# Patient Record
Sex: MALE | Race: WHITE | NOT HISPANIC OR LATINO
[De-identification: names, ages, dates, MRNs, and addresses within clinical notes are randomized per-mention and may not be internally consistent; named-entity substitution may affect disease eponyms.]

---

## 2017-02-03 ENCOUNTER — APPOINTMENT (OUTPATIENT)
Dept: UROLOGY | Facility: CLINIC | Age: 77
End: 2017-02-03

## 2017-02-03 VITALS
DIASTOLIC BLOOD PRESSURE: 80 MMHG | SYSTOLIC BLOOD PRESSURE: 161 MMHG | TEMPERATURE: 97.5 F | RESPIRATION RATE: 17 BRPM | HEART RATE: 66 BPM | BODY MASS INDEX: 27.47 KG/M2 | HEIGHT: 67 IN | WEIGHT: 175 LBS

## 2017-02-28 ENCOUNTER — OUTPATIENT (OUTPATIENT)
Dept: OUTPATIENT SERVICES | Facility: HOSPITAL | Age: 77
LOS: 1 days | End: 2017-02-28
Payer: MEDICARE

## 2017-02-28 VITALS
HEIGHT: 64 IN | RESPIRATION RATE: 16 BRPM | SYSTOLIC BLOOD PRESSURE: 160 MMHG | HEART RATE: 84 BPM | TEMPERATURE: 100 F | DIASTOLIC BLOOD PRESSURE: 80 MMHG | WEIGHT: 177.91 LBS

## 2017-02-28 DIAGNOSIS — N40.1 BENIGN PROSTATIC HYPERPLASIA WITH LOWER URINARY TRACT SYMPTOMS: ICD-10-CM

## 2017-02-28 DIAGNOSIS — I25.10 ATHEROSCLEROTIC HEART DISEASE OF NATIVE CORONARY ARTERY WITHOUT ANGINA PECTORIS: ICD-10-CM

## 2017-02-28 LAB
APPEARANCE UR: CLEAR — SIGNIFICANT CHANGE UP
BILIRUB UR-MCNC: NEGATIVE — SIGNIFICANT CHANGE UP
BLD GP AB SCN SERPL QL: NEGATIVE — SIGNIFICANT CHANGE UP
BLOOD UR QL VISUAL: NEGATIVE — SIGNIFICANT CHANGE UP
BUN SERPL-MCNC: 36 MG/DL — HIGH (ref 7–23)
CALCIUM SERPL-MCNC: 9.6 MG/DL — SIGNIFICANT CHANGE UP (ref 8.4–10.5)
CHLORIDE SERPL-SCNC: 105 MMOL/L — SIGNIFICANT CHANGE UP (ref 98–107)
CO2 SERPL-SCNC: 23 MMOL/L — SIGNIFICANT CHANGE UP (ref 22–31)
COLOR SPEC: YELLOW — SIGNIFICANT CHANGE UP
CREAT SERPL-MCNC: 1.57 MG/DL — HIGH (ref 0.5–1.3)
GLUCOSE SERPL-MCNC: 94 MG/DL — SIGNIFICANT CHANGE UP (ref 70–99)
GLUCOSE UR-MCNC: NEGATIVE — SIGNIFICANT CHANGE UP
HCT VFR BLD CALC: 42.1 % — SIGNIFICANT CHANGE UP (ref 39–50)
HGB BLD-MCNC: 14 G/DL — SIGNIFICANT CHANGE UP (ref 13–17)
KETONES UR-MCNC: NEGATIVE — SIGNIFICANT CHANGE UP
LEUKOCYTE ESTERASE UR-ACNC: NEGATIVE — SIGNIFICANT CHANGE UP
MCHC RBC-ENTMCNC: 31.3 PG — SIGNIFICANT CHANGE UP (ref 27–34)
MCHC RBC-ENTMCNC: 33.3 % — SIGNIFICANT CHANGE UP (ref 32–36)
MCV RBC AUTO: 94.2 FL — SIGNIFICANT CHANGE UP (ref 80–100)
MUCOUS THREADS # UR AUTO: SIGNIFICANT CHANGE UP
NITRITE UR-MCNC: NEGATIVE — SIGNIFICANT CHANGE UP
PH UR: 6 — SIGNIFICANT CHANGE UP (ref 4.6–8)
PLATELET # BLD AUTO: 158 K/UL — SIGNIFICANT CHANGE UP (ref 150–400)
PMV BLD: 11.8 FL — SIGNIFICANT CHANGE UP (ref 7–13)
POTASSIUM SERPL-MCNC: 4.3 MMOL/L — SIGNIFICANT CHANGE UP (ref 3.5–5.3)
POTASSIUM SERPL-SCNC: 4.3 MMOL/L — SIGNIFICANT CHANGE UP (ref 3.5–5.3)
PROT UR-MCNC: 100 — SIGNIFICANT CHANGE UP
RBC # BLD: 4.47 M/UL — SIGNIFICANT CHANGE UP (ref 4.2–5.8)
RBC # FLD: 13.9 % — SIGNIFICANT CHANGE UP (ref 10.3–14.5)
RBC CASTS # UR COMP ASSIST: SIGNIFICANT CHANGE UP (ref 0–?)
RH IG SCN BLD-IMP: POSITIVE — SIGNIFICANT CHANGE UP
SODIUM SERPL-SCNC: 144 MMOL/L — SIGNIFICANT CHANGE UP (ref 135–145)
SP GR SPEC: 1.02 — SIGNIFICANT CHANGE UP (ref 1–1.03)
SQUAMOUS # UR AUTO: SIGNIFICANT CHANGE UP
UROBILINOGEN FLD QL: NORMAL E.U. — SIGNIFICANT CHANGE UP (ref 0.1–0.2)
WBC # BLD: 8.85 K/UL — SIGNIFICANT CHANGE UP (ref 3.8–10.5)
WBC # FLD AUTO: 8.85 K/UL — SIGNIFICANT CHANGE UP (ref 3.8–10.5)
WBC UR QL: SIGNIFICANT CHANGE UP (ref 0–?)

## 2017-02-28 PROCEDURE — 93010 ELECTROCARDIOGRAM REPORT: CPT

## 2017-02-28 RX ORDER — SODIUM CHLORIDE 9 MG/ML
3 INJECTION INTRAMUSCULAR; INTRAVENOUS; SUBCUTANEOUS EVERY 8 HOURS
Qty: 0 | Refills: 0 | Status: DISCONTINUED | OUTPATIENT
Start: 2017-03-08 | End: 2017-03-09

## 2017-02-28 RX ORDER — SODIUM CHLORIDE 9 MG/ML
1000 INJECTION, SOLUTION INTRAVENOUS
Qty: 0 | Refills: 0 | Status: DISCONTINUED | OUTPATIENT
Start: 2017-03-08 | End: 2017-03-09

## 2017-02-28 NOTE — H&P PST ADULT - NEGATIVE ENMT SYMPTOMS
no nose bleeds/no ear pain/no hearing difficulty/no post-nasal discharge/no nasal obstruction/no tinnitus/no abnormal taste sensation/no dry mouth/no sinus symptoms/no dysphagia/no vertigo/no throat pain

## 2017-02-28 NOTE — H&P PST ADULT - PROBLEM SELECTOR PLAN 2
Pt with stented coronary artery, pt instructed to continue Aspirin prior to surgery. Pt verbalized understanding.

## 2017-02-28 NOTE — H&P PST ADULT - GASTROINTESTINAL DETAILS
no masses palpable/no distention/nontender/soft nontender/bowel sounds normal/no masses palpable/no distention/soft

## 2017-02-28 NOTE — H&P PST ADULT - NEGATIVE BREAST SYMPTOMS
no nipple discharge R/no breast tenderness R/no breast tenderness L/no breast lump L/no breast lump R/no nipple discharge L

## 2017-02-28 NOTE — H&P PST ADULT - VISION (WITH CORRECTIVE LENSES IF THE PATIENT USUALLY WEARS THEM):
Partially impaired: cannot see medication labels or newsprint, but can see obstacles in path, and the surrounding layout; can count fingers at arm's length/Pt uses reading glasses

## 2017-02-28 NOTE — H&P PST ADULT - PMH
Alcoholism    BPH (Benign Prostatic Hypertrophy)    CAD (Coronary Artery Disease)    Enlarged prostate with lower urinary tract symptoms (LUTS)    Heart Attack  coronary stent insrtion(SHERRY)- RCA on 09/17/2002  Heart Murmur    HTN (Hypertension)    Hyperlipidemia    Inguinal Hernia  left 2/2011  Obesity (BMI 30.0-34.9)

## 2017-02-28 NOTE — H&P PST ADULT - HISTORY OF PRESENT ILLNESS
75 y/o  male with PMH: HTN, CAD - s/p stented coronary artery, HLD, BPH, GERD, right kidney cancer - S/P nephrectomy in 2010 presents to PST for pre op evaluation with long term hx of urinary frequency, worsening for the last 3 years. Now scheduled for bipolar TURP on 03/08/17 75 y/o  male with PMH: HTN, CAD - s/p stented coronary artery, HLD, BPH, GERD, right kidney cancer - S/P nephrectomy in 2010 presents to PST for pre op evaluation with long term hx of urinary frequency that has been worsening for the last 3 years. Now scheduled for bipolar TURP on 03/08/17

## 2017-02-28 NOTE — H&P PST ADULT - PROBLEM SELECTOR PLAN 1
Scheduled for bipolar transurethral resection of prostate on 03/08/2017. Pre op instructions, famotidine given and explained. Pt verbalized understanding.  Pending medical clearance

## 2017-02-28 NOTE — H&P PST ADULT - NEGATIVE OPHTHALMOLOGIC SYMPTOMS
no photophobia/no diplopia/no loss of vision L/no pain R/no lacrimation L/no discharge R/no lacrimation R/no discharge L/no blurred vision R/no loss of vision R/no pain L/no irritation R/no blurred vision L/no irritation L

## 2017-02-28 NOTE — H&P PST ADULT - PSH
H/O hernia repair  Right inguinal-2002  Renal cancer  h/o right nephrectomy- 05/2011  Renal cancer  2/2010 no further treatment  Postsurgical percutaneous transluminal coronary angioplasty status  RCA  Other postprocedural status

## 2017-02-28 NOTE — H&P PST ADULT - NSANTHOSAYNRD_GEN_A_CORE
No. KANIKA screening performed.  STOP BANG Legend: 0-2 = LOW Risk; 3-4 = INTERMEDIATE Risk; 5-8 = HIGH Risk

## 2017-03-01 LAB — SPECIMEN SOURCE: SIGNIFICANT CHANGE UP

## 2017-03-02 LAB — BACTERIA UR CULT: SIGNIFICANT CHANGE UP

## 2017-03-05 ENCOUNTER — FORM ENCOUNTER (OUTPATIENT)
Age: 77
End: 2017-03-05

## 2017-03-06 ENCOUNTER — APPOINTMENT (OUTPATIENT)
Dept: MRI IMAGING | Facility: IMAGING CENTER | Age: 77
End: 2017-03-06

## 2017-03-06 ENCOUNTER — APPOINTMENT (OUTPATIENT)
Dept: RADIOLOGY | Facility: IMAGING CENTER | Age: 77
End: 2017-03-06

## 2017-03-06 ENCOUNTER — OUTPATIENT (OUTPATIENT)
Dept: OUTPATIENT SERVICES | Facility: HOSPITAL | Age: 77
LOS: 1 days | End: 2017-03-06
Payer: MEDICARE

## 2017-03-06 DIAGNOSIS — C64.9 MALIGNANT NEOPLASM OF UNSPECIFIED KIDNEY, EXCEPT RENAL PELVIS: ICD-10-CM

## 2017-03-06 PROCEDURE — A9585: CPT

## 2017-03-06 PROCEDURE — 74183 MRI ABD W/O CNTR FLWD CNTR: CPT

## 2017-03-06 PROCEDURE — 71046 X-RAY EXAM CHEST 2 VIEWS: CPT

## 2017-03-06 PROCEDURE — 82565 ASSAY OF CREATININE: CPT

## 2017-03-07 ENCOUNTER — RESULT REVIEW (OUTPATIENT)
Age: 77
End: 2017-03-07

## 2017-03-08 ENCOUNTER — INPATIENT (INPATIENT)
Facility: HOSPITAL | Age: 77
LOS: 0 days | Discharge: HOME | End: 2017-03-09
Attending: UROLOGY | Admitting: UROLOGY
Payer: MEDICARE

## 2017-03-08 ENCOUNTER — APPOINTMENT (OUTPATIENT)
Dept: UROLOGY | Facility: HOSPITAL | Age: 77
End: 2017-03-08

## 2017-03-08 VITALS
OXYGEN SATURATION: 96 % | HEIGHT: 64 IN | TEMPERATURE: 98 F | WEIGHT: 177.91 LBS | DIASTOLIC BLOOD PRESSURE: 64 MMHG | SYSTOLIC BLOOD PRESSURE: 153 MMHG | RESPIRATION RATE: 16 BRPM | HEART RATE: 66 BPM

## 2017-03-08 DIAGNOSIS — N40.1 BENIGN PROSTATIC HYPERPLASIA WITH LOWER URINARY TRACT SYMPTOMS: ICD-10-CM

## 2017-03-08 LAB
BUN SERPL-MCNC: 24 MG/DL — HIGH (ref 7–23)
CALCIUM SERPL-MCNC: 8.2 MG/DL — LOW (ref 8.4–10.5)
CHLORIDE SERPL-SCNC: 109 MMOL/L — HIGH (ref 98–107)
CO2 SERPL-SCNC: 21 MMOL/L — LOW (ref 22–31)
CREAT SERPL-MCNC: 1.37 MG/DL — HIGH (ref 0.5–1.3)
GLUCOSE SERPL-MCNC: 117 MG/DL — HIGH (ref 70–99)
HCT VFR BLD CALC: 40.7 % — SIGNIFICANT CHANGE UP (ref 39–50)
HGB BLD-MCNC: 13.3 G/DL — SIGNIFICANT CHANGE UP (ref 13–17)
MCHC RBC-ENTMCNC: 30.6 PG — SIGNIFICANT CHANGE UP (ref 27–34)
MCHC RBC-ENTMCNC: 32.7 % — SIGNIFICANT CHANGE UP (ref 32–36)
MCV RBC AUTO: 93.8 FL — SIGNIFICANT CHANGE UP (ref 80–100)
PLATELET # BLD AUTO: 186 K/UL — SIGNIFICANT CHANGE UP (ref 150–400)
PMV BLD: 11 FL — SIGNIFICANT CHANGE UP (ref 7–13)
POTASSIUM SERPL-MCNC: 4.2 MMOL/L — SIGNIFICANT CHANGE UP (ref 3.5–5.3)
POTASSIUM SERPL-SCNC: 4.2 MMOL/L — SIGNIFICANT CHANGE UP (ref 3.5–5.3)
RBC # BLD: 4.34 M/UL — SIGNIFICANT CHANGE UP (ref 4.2–5.8)
RBC # FLD: 13.6 % — SIGNIFICANT CHANGE UP (ref 10.3–14.5)
SODIUM SERPL-SCNC: 144 MMOL/L — SIGNIFICANT CHANGE UP (ref 135–145)
WBC # BLD: 6.9 K/UL — SIGNIFICANT CHANGE UP (ref 3.8–10.5)
WBC # FLD AUTO: 6.9 K/UL — SIGNIFICANT CHANGE UP (ref 3.8–10.5)

## 2017-03-08 PROCEDURE — 88305 TISSUE EXAM BY PATHOLOGIST: CPT | Mod: 26

## 2017-03-08 RX ORDER — OXYCODONE HYDROCHLORIDE 5 MG/1
5 TABLET ORAL EVERY 4 HOURS
Qty: 0 | Refills: 0 | Status: DISCONTINUED | OUTPATIENT
Start: 2017-03-08 | End: 2017-03-08

## 2017-03-08 RX ORDER — ATORVASTATIN CALCIUM 80 MG/1
20 TABLET, FILM COATED ORAL AT BEDTIME
Qty: 0 | Refills: 0 | Status: DISCONTINUED | OUTPATIENT
Start: 2017-03-08 | End: 2017-03-09

## 2017-03-08 RX ORDER — ONDANSETRON 8 MG/1
4 TABLET, FILM COATED ORAL ONCE
Qty: 0 | Refills: 0 | Status: DISCONTINUED | OUTPATIENT
Start: 2017-03-08 | End: 2017-03-08

## 2017-03-08 RX ORDER — FENTANYL CITRATE 50 UG/ML
25 INJECTION INTRAVENOUS
Qty: 0 | Refills: 0 | Status: DISCONTINUED | OUTPATIENT
Start: 2017-03-08 | End: 2017-03-08

## 2017-03-08 RX ORDER — HYDROMORPHONE HYDROCHLORIDE 2 MG/ML
4 INJECTION INTRAMUSCULAR; INTRAVENOUS; SUBCUTANEOUS EVERY 6 HOURS
Qty: 0 | Refills: 0 | Status: DISCONTINUED | OUTPATIENT
Start: 2017-03-08 | End: 2017-03-09

## 2017-03-08 RX ORDER — ACETAMINOPHEN 500 MG
650 TABLET ORAL EVERY 6 HOURS
Qty: 0 | Refills: 0 | Status: DISCONTINUED | OUTPATIENT
Start: 2017-03-08 | End: 2017-03-08

## 2017-03-08 RX ORDER — HYDROMORPHONE HYDROCHLORIDE 2 MG/ML
2 INJECTION INTRAMUSCULAR; INTRAVENOUS; SUBCUTANEOUS EVERY 4 HOURS
Qty: 0 | Refills: 0 | Status: DISCONTINUED | OUTPATIENT
Start: 2017-03-08 | End: 2017-03-09

## 2017-03-08 RX ORDER — HYDRALAZINE HCL 50 MG
50 TABLET ORAL THREE TIMES A DAY
Qty: 0 | Refills: 0 | Status: DISCONTINUED | OUTPATIENT
Start: 2017-03-08 | End: 2017-03-09

## 2017-03-08 RX ORDER — OXYCODONE HYDROCHLORIDE 5 MG/1
10 TABLET ORAL EVERY 6 HOURS
Qty: 0 | Refills: 0 | Status: DISCONTINUED | OUTPATIENT
Start: 2017-03-08 | End: 2017-03-08

## 2017-03-08 RX ORDER — HYDROMORPHONE HYDROCHLORIDE 2 MG/ML
1 INJECTION INTRAMUSCULAR; INTRAVENOUS; SUBCUTANEOUS
Qty: 0 | Refills: 0 | Status: DISCONTINUED | OUTPATIENT
Start: 2017-03-08 | End: 2017-03-08

## 2017-03-08 RX ORDER — ATORVASTATIN CALCIUM 80 MG/1
1 TABLET, FILM COATED ORAL
Qty: 0 | Refills: 0 | COMMUNITY

## 2017-03-08 RX ORDER — HYDRALAZINE HCL 50 MG
1 TABLET ORAL
Qty: 0 | Refills: 0 | COMMUNITY

## 2017-03-08 RX ORDER — HEPARIN SODIUM 5000 [USP'U]/ML
5000 INJECTION INTRAVENOUS; SUBCUTANEOUS EVERY 8 HOURS
Qty: 0 | Refills: 0 | Status: DISCONTINUED | OUTPATIENT
Start: 2017-03-08 | End: 2017-03-08

## 2017-03-08 RX ORDER — TAMSULOSIN HYDROCHLORIDE 0.4 MG/1
0.4 CAPSULE ORAL AT BEDTIME
Qty: 0 | Refills: 0 | Status: DISCONTINUED | OUTPATIENT
Start: 2017-03-08 | End: 2017-03-09

## 2017-03-08 RX ORDER — NEBIVOLOL HYDROCHLORIDE 5 MG/1
1 TABLET ORAL
Qty: 0 | Refills: 0 | COMMUNITY

## 2017-03-08 RX ORDER — HYDROMORPHONE HYDROCHLORIDE 2 MG/ML
2 INJECTION INTRAMUSCULAR; INTRAVENOUS; SUBCUTANEOUS EVERY 6 HOURS
Qty: 0 | Refills: 0 | Status: DISCONTINUED | OUTPATIENT
Start: 2017-03-08 | End: 2017-03-09

## 2017-03-08 RX ORDER — TAMSULOSIN HYDROCHLORIDE 0.4 MG/1
1 CAPSULE ORAL
Qty: 0 | Refills: 0 | COMMUNITY

## 2017-03-08 RX ORDER — HYDROMORPHONE HYDROCHLORIDE 2 MG/ML
0.5 INJECTION INTRAMUSCULAR; INTRAVENOUS; SUBCUTANEOUS
Qty: 0 | Refills: 0 | Status: DISCONTINUED | OUTPATIENT
Start: 2017-03-08 | End: 2017-03-08

## 2017-03-08 RX ORDER — OXYCODONE HYDROCHLORIDE 5 MG/1
5 TABLET ORAL EVERY 6 HOURS
Qty: 0 | Refills: 0 | Status: DISCONTINUED | OUTPATIENT
Start: 2017-03-08 | End: 2017-03-08

## 2017-03-08 RX ORDER — ASPIRIN/CALCIUM CARB/MAGNESIUM 324 MG
1 TABLET ORAL
Qty: 0 | Refills: 0 | COMMUNITY

## 2017-03-08 RX ORDER — HEPARIN SODIUM 5000 [USP'U]/ML
5000 INJECTION INTRAVENOUS; SUBCUTANEOUS EVERY 8 HOURS
Qty: 0 | Refills: 0 | Status: DISCONTINUED | OUTPATIENT
Start: 2017-03-08 | End: 2017-03-09

## 2017-03-08 RX ORDER — FINASTERIDE 5 MG/1
5 TABLET, FILM COATED ORAL DAILY
Qty: 0 | Refills: 0 | Status: DISCONTINUED | OUTPATIENT
Start: 2017-03-08 | End: 2017-03-09

## 2017-03-08 RX ORDER — FINASTERIDE 5 MG/1
1 TABLET, FILM COATED ORAL
Qty: 0 | Refills: 0 | COMMUNITY

## 2017-03-08 RX ORDER — LIDOCAINE HCL 20 MG/ML
5 VIAL (ML) INJECTION ONCE
Qty: 0 | Refills: 0 | Status: DISCONTINUED | OUTPATIENT
Start: 2017-03-08 | End: 2017-03-09

## 2017-03-08 RX ORDER — METOPROLOL TARTRATE 50 MG
25 TABLET ORAL
Qty: 0 | Refills: 0 | Status: DISCONTINUED | OUTPATIENT
Start: 2017-03-08 | End: 2017-03-09

## 2017-03-08 RX ORDER — ACETAMINOPHEN 500 MG
1000 TABLET ORAL ONCE
Qty: 0 | Refills: 0 | Status: COMPLETED | OUTPATIENT
Start: 2017-03-08 | End: 2017-03-08

## 2017-03-08 RX ORDER — FAMOTIDINE 10 MG/ML
1 INJECTION INTRAVENOUS
Qty: 0 | Refills: 0 | COMMUNITY

## 2017-03-08 RX ADMIN — ATORVASTATIN CALCIUM 20 MILLIGRAM(S): 80 TABLET, FILM COATED ORAL at 21:08

## 2017-03-08 RX ADMIN — HYDROMORPHONE HYDROCHLORIDE 1 MILLIGRAM(S): 2 INJECTION INTRAMUSCULAR; INTRAVENOUS; SUBCUTANEOUS at 13:15

## 2017-03-08 RX ADMIN — SODIUM CHLORIDE 3 MILLILITER(S): 9 INJECTION INTRAMUSCULAR; INTRAVENOUS; SUBCUTANEOUS at 14:00

## 2017-03-08 RX ADMIN — HYDROMORPHONE HYDROCHLORIDE 4 MILLIGRAM(S): 2 INJECTION INTRAMUSCULAR; INTRAVENOUS; SUBCUTANEOUS at 21:31

## 2017-03-08 RX ADMIN — SODIUM CHLORIDE 3 MILLILITER(S): 9 INJECTION INTRAMUSCULAR; INTRAVENOUS; SUBCUTANEOUS at 21:09

## 2017-03-08 RX ADMIN — TAMSULOSIN HYDROCHLORIDE 0.4 MILLIGRAM(S): 0.4 CAPSULE ORAL at 21:08

## 2017-03-08 RX ADMIN — HYDROMORPHONE HYDROCHLORIDE 0.5 MILLIGRAM(S): 2 INJECTION INTRAMUSCULAR; INTRAVENOUS; SUBCUTANEOUS at 12:30

## 2017-03-08 RX ADMIN — FINASTERIDE 5 MILLIGRAM(S): 5 TABLET, FILM COATED ORAL at 21:08

## 2017-03-08 RX ADMIN — SODIUM CHLORIDE 100 MILLILITER(S): 9 INJECTION, SOLUTION INTRAVENOUS at 12:15

## 2017-03-08 RX ADMIN — Medication 25 MILLIGRAM(S): at 18:50

## 2017-03-08 RX ADMIN — Medication 1000 MILLIGRAM(S): at 14:45

## 2017-03-08 RX ADMIN — HEPARIN SODIUM 5000 UNIT(S): 5000 INJECTION INTRAVENOUS; SUBCUTANEOUS at 21:08

## 2017-03-08 RX ADMIN — Medication 400 MILLIGRAM(S): at 14:37

## 2017-03-08 RX ADMIN — SODIUM CHLORIDE 30 MILLILITER(S): 9 INJECTION, SOLUTION INTRAVENOUS at 09:15

## 2017-03-08 RX ADMIN — HYDROMORPHONE HYDROCHLORIDE 1 MILLIGRAM(S): 2 INJECTION INTRAMUSCULAR; INTRAVENOUS; SUBCUTANEOUS at 13:00

## 2017-03-08 RX ADMIN — HYDROMORPHONE HYDROCHLORIDE 1 MILLIGRAM(S): 2 INJECTION INTRAMUSCULAR; INTRAVENOUS; SUBCUTANEOUS at 12:45

## 2017-03-08 RX ADMIN — HYDROMORPHONE HYDROCHLORIDE 0.5 MILLIGRAM(S): 2 INJECTION INTRAMUSCULAR; INTRAVENOUS; SUBCUTANEOUS at 12:45

## 2017-03-08 RX ADMIN — Medication 50 MILLIGRAM(S): at 21:08

## 2017-03-08 RX ADMIN — SODIUM CHLORIDE 100 MILLILITER(S): 9 INJECTION, SOLUTION INTRAVENOUS at 17:44

## 2017-03-08 RX ADMIN — HYDROMORPHONE HYDROCHLORIDE 1 MILLIGRAM(S): 2 INJECTION INTRAMUSCULAR; INTRAVENOUS; SUBCUTANEOUS at 13:30

## 2017-03-08 RX ADMIN — HYDROMORPHONE HYDROCHLORIDE 4 MILLIGRAM(S): 2 INJECTION INTRAMUSCULAR; INTRAVENOUS; SUBCUTANEOUS at 21:01

## 2017-03-08 RX ADMIN — HEPARIN SODIUM 5000 UNIT(S): 5000 INJECTION INTRAVENOUS; SUBCUTANEOUS at 16:36

## 2017-03-08 NOTE — PATIENT PROFILE ADULT. - NSALCOHOLUSECOMMENT_GEN_ALL_CORE_FT
Pt states he was an "alcoholic" but quit 15 yrs ago. He has an occassional drink, but does not feel dependent on it.

## 2017-03-09 ENCOUNTER — TRANSCRIPTION ENCOUNTER (OUTPATIENT)
Age: 77
End: 2017-03-09

## 2017-03-09 VITALS
DIASTOLIC BLOOD PRESSURE: 56 MMHG | RESPIRATION RATE: 18 BRPM | OXYGEN SATURATION: 100 % | HEART RATE: 72 BPM | TEMPERATURE: 98 F | SYSTOLIC BLOOD PRESSURE: 127 MMHG

## 2017-03-09 LAB
BUN SERPL-MCNC: 23 MG/DL — SIGNIFICANT CHANGE UP (ref 7–23)
CALCIUM SERPL-MCNC: 8.1 MG/DL — LOW (ref 8.4–10.5)
CHLORIDE SERPL-SCNC: 107 MMOL/L — SIGNIFICANT CHANGE UP (ref 98–107)
CO2 SERPL-SCNC: 23 MMOL/L — SIGNIFICANT CHANGE UP (ref 22–31)
CREAT SERPL-MCNC: 1.34 MG/DL — HIGH (ref 0.5–1.3)
GLUCOSE SERPL-MCNC: 101 MG/DL — HIGH (ref 70–99)
HCT VFR BLD CALC: 36.1 % — LOW (ref 39–50)
HGB BLD-MCNC: 11.7 G/DL — LOW (ref 13–17)
MCHC RBC-ENTMCNC: 30.5 PG — SIGNIFICANT CHANGE UP (ref 27–34)
MCHC RBC-ENTMCNC: 32.4 % — SIGNIFICANT CHANGE UP (ref 32–36)
MCV RBC AUTO: 94 FL — SIGNIFICANT CHANGE UP (ref 80–100)
PLATELET # BLD AUTO: 185 K/UL — SIGNIFICANT CHANGE UP (ref 150–400)
PMV BLD: 11.1 FL — SIGNIFICANT CHANGE UP (ref 7–13)
POTASSIUM SERPL-MCNC: 4.1 MMOL/L — SIGNIFICANT CHANGE UP (ref 3.5–5.3)
POTASSIUM SERPL-SCNC: 4.1 MMOL/L — SIGNIFICANT CHANGE UP (ref 3.5–5.3)
RBC # BLD: 3.84 M/UL — LOW (ref 4.2–5.8)
RBC # FLD: 13.9 % — SIGNIFICANT CHANGE UP (ref 10.3–14.5)
SODIUM SERPL-SCNC: 142 MMOL/L — SIGNIFICANT CHANGE UP (ref 135–145)
WBC # BLD: 9.65 K/UL — SIGNIFICANT CHANGE UP (ref 3.8–10.5)
WBC # FLD AUTO: 9.65 K/UL — SIGNIFICANT CHANGE UP (ref 3.8–10.5)

## 2017-03-09 RX ORDER — PSYLLIUM SEED (WITH DEXTROSE)
1 POWDER (GRAM) ORAL
Qty: 0 | Refills: 0 | COMMUNITY
Start: 2017-03-09

## 2017-03-09 RX ORDER — DOCUSATE SODIUM 100 MG
1 CAPSULE ORAL
Qty: 0 | Refills: 0 | COMMUNITY
Start: 2017-03-09

## 2017-03-09 RX ORDER — HYDROMORPHONE HYDROCHLORIDE 2 MG/ML
2 INJECTION INTRAMUSCULAR; INTRAVENOUS; SUBCUTANEOUS EVERY 4 HOURS
Qty: 0 | Refills: 0 | Status: DISCONTINUED | OUTPATIENT
Start: 2017-03-09 | End: 2017-03-09

## 2017-03-09 RX ORDER — HYDROMORPHONE HYDROCHLORIDE 2 MG/ML
0.5 INJECTION INTRAMUSCULAR; INTRAVENOUS; SUBCUTANEOUS
Qty: 0 | Refills: 0 | Status: DISCONTINUED | OUTPATIENT
Start: 2017-03-09 | End: 2017-03-09

## 2017-03-09 RX ORDER — ACETAMINOPHEN 500 MG
650 TABLET ORAL EVERY 6 HOURS
Qty: 0 | Refills: 0 | Status: DISCONTINUED | OUTPATIENT
Start: 2017-03-09 | End: 2017-03-09

## 2017-03-09 RX ORDER — DOCUSATE SODIUM 100 MG
100 CAPSULE ORAL THREE TIMES A DAY
Qty: 0 | Refills: 0 | Status: DISCONTINUED | OUTPATIENT
Start: 2017-03-09 | End: 2017-03-09

## 2017-03-09 RX ORDER — PSYLLIUM SEED (WITH DEXTROSE)
1 POWDER (GRAM) ORAL DAILY
Qty: 0 | Refills: 0 | Status: DISCONTINUED | OUTPATIENT
Start: 2017-03-09 | End: 2017-03-09

## 2017-03-09 RX ORDER — HYDROMORPHONE HYDROCHLORIDE 2 MG/ML
1 INJECTION INTRAMUSCULAR; INTRAVENOUS; SUBCUTANEOUS
Qty: 15 | Refills: 0 | OUTPATIENT
Start: 2017-03-09

## 2017-03-09 RX ORDER — SENNA PLUS 8.6 MG/1
2 TABLET ORAL AT BEDTIME
Qty: 0 | Refills: 0 | Status: DISCONTINUED | OUTPATIENT
Start: 2017-03-09 | End: 2017-03-09

## 2017-03-09 RX ORDER — ASPIRIN/CALCIUM CARB/MAGNESIUM 324 MG
81 TABLET ORAL DAILY
Qty: 0 | Refills: 0 | Status: DISCONTINUED | OUTPATIENT
Start: 2017-03-09 | End: 2017-03-09

## 2017-03-09 RX ORDER — SENNA PLUS 8.6 MG/1
2 TABLET ORAL
Qty: 0 | Refills: 0 | COMMUNITY
Start: 2017-03-09

## 2017-03-09 RX ORDER — SODIUM CHLORIDE 9 MG/ML
1000 INJECTION, SOLUTION INTRAVENOUS
Qty: 0 | Refills: 0 | Status: DISCONTINUED | OUTPATIENT
Start: 2017-03-09 | End: 2017-03-09

## 2017-03-09 RX ORDER — LIDOCAINE 4 G/100G
1 CREAM TOPICAL
Qty: 0 | Refills: 0 | Status: DISCONTINUED | OUTPATIENT
Start: 2017-03-09 | End: 2017-03-09

## 2017-03-09 RX ADMIN — HEPARIN SODIUM 5000 UNIT(S): 5000 INJECTION INTRAVENOUS; SUBCUTANEOUS at 07:08

## 2017-03-09 RX ADMIN — Medication 25 MILLIGRAM(S): at 07:07

## 2017-03-09 RX ADMIN — SODIUM CHLORIDE 3 MILLILITER(S): 9 INJECTION INTRAMUSCULAR; INTRAVENOUS; SUBCUTANEOUS at 07:09

## 2017-03-09 RX ADMIN — Medication 50 MILLIGRAM(S): at 07:07

## 2017-03-09 RX ADMIN — HYDROMORPHONE HYDROCHLORIDE 0.5 MILLIGRAM(S): 2 INJECTION INTRAMUSCULAR; INTRAVENOUS; SUBCUTANEOUS at 04:57

## 2017-03-09 RX ADMIN — HYDROMORPHONE HYDROCHLORIDE 0.5 MILLIGRAM(S): 2 INJECTION INTRAMUSCULAR; INTRAVENOUS; SUBCUTANEOUS at 05:20

## 2017-03-09 NOTE — DISCHARGE NOTE ADULT - INSTRUCTIONS
Drink plenty of fluids Call MD for any c/o nausea, vomit, difficulty urinating, bloody urine, fever >101.0 and a return appointment.

## 2017-03-09 NOTE — DISCHARGE NOTE ADULT - CARE PROVIDER_API CALL
Sonido Pablo), Urology  [address] [place] [id number]  Phone: [phone]  Fax: [phone]

## 2017-03-09 NOTE — DISCHARGE NOTE ADULT - CARE PROVIDERS DIRECT ADDRESSES
[email or web address]

## 2017-03-09 NOTE — DISCHARGE NOTE ADULT - ADDITIONAL INSTRUCTIONS
Please follow up with your primary care physician regarding your hospitalization within 2 weeks after your discharge.

## 2017-03-09 NOTE — DISCHARGE NOTE ADULT - PLAN OF CARE
Return of good urinary flow Drink plenty of fluids.  No heavy lifting or straining for 4 to 6 weeks, avoid constipation. You may have intermittent pink tinged urine and urinary dribbling.  This is normal.   If your urine becomes bright red or with clots, please call the office.  Call Dr. Pablo's office to schedule a follow up appointment.  Call the office if you have fever greater than 101, difficulty urinating, pain not relieved with pain medication, nausea/vomiting. Continue current home medications and follow up with your primary care provider

## 2017-03-09 NOTE — DISCHARGE NOTE ADULT - MEDICATION SUMMARY - MEDICATIONS TO STOP TAKING
I will STOP taking the medications listed below when I get home from the hospital:    Aleve 220 mg oral tablet  -- 1 tab(s) by mouth , As Needed. Last dose 3/1/17

## 2017-03-09 NOTE — DISCHARGE NOTE ADULT - CARE PLAN
Principal Discharge DX:	BPH (benign prostatic hypertrophy)  Goal:	Return of good urinary flow  Instructions for follow-up, activity and diet:	Drink plenty of fluids.  No heavy lifting or straining for 4 to 6 weeks, avoid constipation. You may have intermittent pink tinged urine and urinary dribbling.  This is normal.   If your urine becomes bright red or with clots, please call the office.  Call Dr. Pablo's office to schedule a follow up appointment.  Call the office if you have fever greater than 101, difficulty urinating, pain not relieved with pain medication, nausea/vomiting.  Secondary Diagnosis:	CAD (coronary artery disease)  Instructions for follow-up, activity and diet:	Continue current home medications and follow up with your primary care provider  Secondary Diagnosis:	HTN (hypertension)  Instructions for follow-up, activity and diet:	Continue current home medications and follow up with your primary care provider

## 2017-03-09 NOTE — DISCHARGE NOTE ADULT - MEDICATION SUMMARY - MEDICATIONS TO TAKE
I will START or STAY ON the medications listed below when I get home from the hospital:    finasteride 5 mg oral tablet  -- 1 tab(s) by mouth once a day in pm  -- Indication: For Home med    HYDROmorphone 2 mg oral tablet  -- 1 to 2 tab(s) by mouth every 4 to 6 hours, As needed, for pain MDD:4  -- Indication: For Pain    aspirin 81 mg oral tablet  -- 1 tab(s) by mouth once a day in am  -- Indication: For Home med    tamsulosin 0.4 mg oral capsule  -- 1 cap(s) by mouth 2 times a day  -- Indication: For Home med    Lipitor 20 mg oral tablet  -- 1 tab(s) by mouth once a day in pm  -- Indication: For Home med    Bystolic 5 mg oral tablet  -- 1 tab(s) by mouth once a day in pm  -- Indication: For Home med    famotidine 20 mg oral tablet  -- 1 tab(s) by mouth once a day in am  -- Indication: For Home med    senna oral tablet  -- 2 tab(s) by mouth once a day (at bedtime)  -- Indication: For Constipation    psyllium 3.4 g/7 g oral powder for reconstitution  -- 1 dose(s) by mouth once a day  -- Indication: For Constipation    docusate sodium 100 mg oral capsule  -- 1 cap(s) by mouth 3 times a day  -- Indication: For Stool softener    hydrALAZINE 50 mg oral tablet  -- 1 tab(s) by mouth 3 times a day  -- Indication: For Home med I will START or STAY ON the medications listed below when I get home from the hospital:    finasteride 5 mg oral tablet  -- 1 tab(s) by mouth once a day in pm  -- Indication: For Home med    HYDROmorphone 2 mg oral tablet  -- 1 to 2 tab(s) by mouth every 4 to 6 hours, As needed, for pain MDD:4  -- Indication: For Pain     aspirin 81 mg oral tablet  -- 1 tab(s) by mouth once a day in am  -- Indication: For Home med    tamsulosin 0.4 mg oral capsule  -- 1 cap(s) by mouth 2 times a day  -- Indication: For Home med    Lipitor 20 mg oral tablet  -- 1 tab(s) by mouth once a day in pm  -- Indication: For Home med    Bystolic 5 mg oral tablet  -- 1 tab(s) by mouth once a day in pm  -- Indication: For Home med    famotidine 20 mg oral tablet  -- 1 tab(s) by mouth once a day in am  -- Indication: For Home med    senna oral tablet  -- 2 tab(s) by mouth once a day (at bedtime)  -- Indication: For Constipation    psyllium 3.4 g/7 g oral powder for reconstitution  -- 1 dose(s) by mouth once a day  -- Indication: For Constipation    docusate sodium 100 mg oral capsule  -- 1 cap(s) by mouth 3 times a day  -- Indication: For Stool softener    hydrALAZINE 50 mg oral tablet  -- 1 tab(s) by mouth 3 times a day  -- Indication: For Home med

## 2017-03-09 NOTE — DISCHARGE NOTE ADULT - NS AS ACTIVITY OBS
Walking-Indoors allowed/Showering allowed/Stairs allowed/No Heavy lifting/straining/Walking-Outdoors allowed

## 2017-03-10 LAB — SURGICAL PATHOLOGY STUDY: SIGNIFICANT CHANGE UP

## 2017-03-17 ENCOUNTER — APPOINTMENT (OUTPATIENT)
Dept: UROLOGY | Facility: CLINIC | Age: 77
End: 2017-03-17

## 2017-05-05 ENCOUNTER — APPOINTMENT (OUTPATIENT)
Dept: UROLOGY | Facility: CLINIC | Age: 77
End: 2017-05-05

## 2017-05-05 RX ORDER — ZOLPIDEM TARTRATE 10 MG/1
10 TABLET ORAL
Qty: 30 | Refills: 0 | Status: ACTIVE | COMMUNITY
Start: 2017-04-10

## 2017-05-08 LAB
APPEARANCE: CLEAR
BACTERIA UR CULT: NORMAL
BACTERIA: NEGATIVE
BILIRUBIN URINE: NEGATIVE
BLOOD URINE: ABNORMAL
COLOR: YELLOW
GLUCOSE QUALITATIVE U: NORMAL MG/DL
HYALINE CASTS: 2 /LPF
KETONES URINE: NEGATIVE
LEUKOCYTE ESTERASE URINE: ABNORMAL
MICROSCOPIC-UA: NORMAL
NITRITE URINE: NEGATIVE
PH URINE: 5.5
PROTEIN URINE: 300 MG/DL
RED BLOOD CELLS URINE: 17 /HPF
SPECIFIC GRAVITY URINE: 1.02
SQUAMOUS EPITHELIAL CELLS: 0 /HPF
UROBILINOGEN URINE: NORMAL MG/DL
WHITE BLOOD CELLS URINE: 29 /HPF

## 2017-06-13 ENCOUNTER — APPOINTMENT (OUTPATIENT)
Dept: UROLOGY | Facility: CLINIC | Age: 77
End: 2017-06-13

## 2017-06-13 DIAGNOSIS — Z86.39 PERSONAL HISTORY OF OTHER ENDOCRINE, NUTRITIONAL AND METABOLIC DISEASE: ICD-10-CM

## 2017-06-13 DIAGNOSIS — K40.90 UNILATERAL INGUINAL HERNIA, W/OUT OBSTRUCTION OR GANGRENE, NOT SPECIFIED AS RECURRENT: ICD-10-CM

## 2017-06-14 LAB
ANION GAP SERPL CALC-SCNC: 18 MMOL/L
BUN SERPL-MCNC: 29 MG/DL
CALCIUM SERPL-MCNC: 9.3 MG/DL
CHLORIDE SERPL-SCNC: 106 MMOL/L
CO2 SERPL-SCNC: 21 MMOL/L
CREAT SERPL-MCNC: 1.67 MG/DL
GLUCOSE SERPL-MCNC: 71 MG/DL
POTASSIUM SERPL-SCNC: 4.7 MMOL/L
SODIUM SERPL-SCNC: 145 MMOL/L

## 2017-06-15 LAB — BACTERIA UR CULT: NORMAL

## 2017-06-19 ENCOUNTER — OUTPATIENT (OUTPATIENT)
Dept: OUTPATIENT SERVICES | Facility: HOSPITAL | Age: 77
LOS: 1 days | End: 2017-06-19
Payer: MEDICARE

## 2017-06-19 ENCOUNTER — APPOINTMENT (OUTPATIENT)
Dept: ULTRASOUND IMAGING | Facility: CLINIC | Age: 77
End: 2017-06-19

## 2017-06-19 DIAGNOSIS — Z00.8 ENCOUNTER FOR OTHER GENERAL EXAMINATION: ICD-10-CM

## 2017-06-19 PROCEDURE — 76775 US EXAM ABDO BACK WALL LIM: CPT

## 2017-07-12 ENCOUNTER — APPOINTMENT (OUTPATIENT)
Dept: NEPHROLOGY | Facility: CLINIC | Age: 77
End: 2017-07-12

## 2017-07-12 VITALS
DIASTOLIC BLOOD PRESSURE: 71 MMHG | RESPIRATION RATE: 17 BRPM | TEMPERATURE: 97.6 F | HEART RATE: 73 BPM | WEIGHT: 173 LBS | HEIGHT: 65 IN | BODY MASS INDEX: 28.82 KG/M2 | SYSTOLIC BLOOD PRESSURE: 191 MMHG

## 2017-07-12 DIAGNOSIS — Z95.5 PRESENCE OF CORONARY ANGIOPLASTY IMPLANT AND GRAFT: ICD-10-CM

## 2017-07-12 DIAGNOSIS — E78.5 HYPERLIPIDEMIA, UNSPECIFIED: ICD-10-CM

## 2017-07-25 ENCOUNTER — APPOINTMENT (OUTPATIENT)
Dept: NEPHROLOGY | Facility: CLINIC | Age: 77
End: 2017-07-25

## 2017-07-25 VITALS
TEMPERATURE: 98.3 F | HEART RATE: 59 BPM | DIASTOLIC BLOOD PRESSURE: 85 MMHG | SYSTOLIC BLOOD PRESSURE: 182 MMHG | WEIGHT: 173 LBS | BODY MASS INDEX: 28.82 KG/M2 | RESPIRATION RATE: 17 BRPM | HEIGHT: 65 IN | OXYGEN SATURATION: 96 %

## 2018-11-23 ENCOUNTER — TRANSCRIPTION ENCOUNTER (OUTPATIENT)
Age: 78
End: 2018-11-23

## 2019-01-31 PROBLEM — N40.1 BENIGN PROSTATIC HYPERPLASIA WITH LOWER URINARY TRACT SYMPTOMS: Chronic | Status: ACTIVE | Noted: 2017-02-28

## 2019-01-31 PROBLEM — E66.9 OBESITY, UNSPECIFIED: Chronic | Status: ACTIVE | Noted: 2017-02-28

## 2019-02-05 ENCOUNTER — APPOINTMENT (OUTPATIENT)
Dept: UROLOGY | Facility: CLINIC | Age: 79
End: 2019-02-05
Payer: MEDICARE

## 2019-02-05 PROCEDURE — 99214 OFFICE O/P EST MOD 30 MIN: CPT

## 2019-02-05 NOTE — HISTORY OF PRESENT ILLNESS
[FreeTextEntry1] : TURP in 2017. He has urinary frequency . He had a CABG in January 2018 . His Creatine bumped to 2.2 after surgery \par His last creatinine was 1.5 2 months ago .

## 2019-02-05 NOTE — PHYSICAL EXAM
[General Appearance - Well Developed] : well developed [General Appearance - Well Nourished] : well nourished [Abdomen Soft] : soft [Skin Color & Pigmentation] : normal skin color and pigmentation [Skin Turgor] : supple [Heart Rate And Rhythm] : Heart rate and rhythm were normal [] : no respiratory distress [Oriented To Time, Place, And Person] : oriented to person, place, and time [Normal Station and Gait] : the gait and station were normal for the patient's age [No Focal Deficits] : no focal deficits [Prostate Size ___ gm] : prostate size [unfilled] gm [FreeTextEntry1] : 2

## 2019-02-06 LAB
ANION GAP SERPL CALC-SCNC: 13 MMOL/L
BUN SERPL-MCNC: 32 MG/DL
CALCIUM SERPL-MCNC: 9.2 MG/DL
CHLORIDE SERPL-SCNC: 107 MMOL/L
CO2 SERPL-SCNC: 24 MMOL/L
CREAT SERPL-MCNC: 1.47 MG/DL
GLUCOSE SERPL-MCNC: 101 MG/DL
POTASSIUM SERPL-SCNC: 4.7 MMOL/L
SODIUM SERPL-SCNC: 144 MMOL/L

## 2019-07-18 NOTE — ASU PREOP CHECKLIST - ADVANCE DIRECTIVE ADDRESSED/READDRESSED
"Mr. Holland is a 76 year old male patient whose current medical conditions include CAD s/p remote CABG x 2, old MI, HTN, and hyperlipidemia who presented to [facility] ED yesterday s/p fall. Patient reported falling/"passing out" after becoming dizzy while mowing the grass. He denied any associated natalia chest pain, SOB, or palpitations. Post-fall, he complained of left leg/ankle pain. Further imaging revealed acute comminuted fractures of the tibia and fibula and patient was subsequently admitted for reduction and external fixation. Cardiology consulted for pre-op clearance given patient's history. Patient seen and examined today. Feels well. No complaints at time of exam. Reports chronic bouts of intermittent angina/chest pain that occur after heavy exertion. States this has been an ongoing issue for "many years". Denies any increase in frequency or severity. States he will occasionally have to take sublingual nitro, which mitigates his symptoms. Denies any other cardiac complaints. He reports compliance with his medications. Followed by Dr. Burroughs in clinic. Chart reviewed. EKG shows NSR, normal EKG. Preliminary read of 2D echo at bedside by Dr. Hernandez shows normal EF. Troponin 0.028>0.033. H/H 8.2/22.9. Creatinine 1.8>1.6.  " done/defers to spouse

## 2020-07-27 DIAGNOSIS — M25.562 PAIN IN LEFT KNEE: ICD-10-CM

## 2020-07-29 ENCOUNTER — APPOINTMENT (OUTPATIENT)
Dept: ORTHOPEDIC SURGERY | Facility: CLINIC | Age: 80
End: 2020-07-29
Payer: MEDICARE

## 2020-07-29 DIAGNOSIS — M17.12 UNILATERAL PRIMARY OSTEOARTHRITIS, LEFT KNEE: ICD-10-CM

## 2020-07-29 DIAGNOSIS — S83.241A OTHER TEAR OF MEDIAL MENISCUS, CURRENT INJURY, RIGHT KNEE, INITIAL ENCOUNTER: ICD-10-CM

## 2020-07-29 PROCEDURE — 73564 X-RAY EXAM KNEE 4 OR MORE: CPT | Mod: LT

## 2020-07-29 PROCEDURE — 99203 OFFICE O/P NEW LOW 30 MIN: CPT

## 2020-07-29 PROCEDURE — 73560 X-RAY EXAM OF KNEE 1 OR 2: CPT | Mod: RT

## 2020-07-29 NOTE — PHYSICAL EXAM
[de-identified] : General appearance: well nourished and hydrated, pleasant, alert and oriented x 3, cooperative.\par HEENT: Normocephalic, EOM intact, Nasal septum midline, Oral cavity clear, External auditory canal clear.\par Cardiovascular: no apparent abnormalities, no lower leg edema, no varicosities, pedal pulses are palpable, discoloration with mild venous changes\par Lymphatics Lymph nodes: none palpated, Lymphedema: not present.\par Neurologic: sensation is normal, no muscle weakness in upper or lower extremities, patella tendon reflexes intact .\par Dermatologic no apparent skin lesions, moist, warm, no rash.\par Spine:cervical spine appears normal and moves freely, thoracic spine appears normal and moves freely, lumbosacral spine appears normal and moves freely.\par Gait: nonantalgic.\par \par Left knee\par Inspection: no effusion or erythema.\par Wounds: none.\par Alignment: normal.\par Palpation: no specific tenderness on palpation.\par ROM active (in degrees): 0-120\par Ligamentous laxity: all ligaments appear stable,, negative ant. drawer test, negative post. drawer test, stable to varus stress test, stable to valgus stress test. negative Lachman's test, negative pivot shift test\par Meniscal Test: negative McMurrays, negative Frandy.\par Patellofemoral Alignment Test: Q angle-, normal.\par Muscle Test: good quad strength.\par \par Right knee\par Inspection: no effusion or erythema.\par Wounds: none.\par Alignment: normal.\par Palpation: no specific tenderness on palpation.\par ROM active (in degrees): 0-120\par Ligamentous laxity: all ligaments appear stable,, negative ant. drawer test, negative post. drawer test, stable to varus stress test, stable to valgus stress test. negative Lachman's test, negative pivot shift test\par Meniscal Test: negative McMurrays, negative Frandy.\par Patellofemoral Alignment Test: Q angle-, normal.\par Muscle Test: good quad strength.\par \par Left hip\par Inspection: No swelling or ecchymosis.\par Wounds: none.\par Palpation: non-tender.\par Stability: no instability.\par Strength: 5/5 all motor groups.\par ROM: no pain with FROM.\par Leg length: equal.\par \par Right hip\par Inspection: No swelling or ecchymosis.\par Wounds: none.\par Palpation: non-tender.\par Stability: no instability.\par Strength: 5/5 all motor groups.\par ROM: no pain with FROM.\par Leg length: equal.\par \par Left ankle\par Inspection: no erythema noted, no swelling noted.\par Palpation: no pain on palpation .\par ROM: FROM without crepitus.\par Muscle strength: 5/5.\par Stability: no instability noted.\par \par Right ankle\par Inspection: no erythema noted, no swelling noted.\par ROM: FROM without crepitus.\par Palpation: no pain on palpation .\par Muscle strength: 5/5.\par Stability: no instability noted.\par \par Left foot\par Inspection: color, texture and turgor are normal.\par ROM: full range of motion of all joints without pain or crepitus.\par Palpation: no tenderness.\par Stability: no instability noted.\par \par Right foot\par Inspection: color, texture and turgor are normal.\par ROM: full range of motion of all joints without pain or crepitus.\par Palpation: no tenderness.\par Stability: no instability noted.\par \par Left shoulder\par Inspection: no muscle asymmetry, no atrophy.\par Palpation: no tenderness noted, ACJ non-tender.\par ROM: full active ROM, full passive ROM.\par Strength testing): anterior deltoid, supraspinatus, infraspinatus, subscapularis all 5/5.\par Stability test: ant. apprehension negative, post. apprehension negative, relocation test negative.\par Impingement Test: negative NEER.\par \par Right shoulder\par Inspection: no muscle asymmetry, no atrophy.\par Palpation: no tenderness noted, ACJ non-tender.\par ROM: full active ROM, full passive ROM.\par Strength testing): anterior deltoid, supraspinatus, infraspinatus, subscapularis all 5/5.\par Stability test: ant. apprehension negative, post. apprehension negative, relocation test negative.\par Impingement Test: negative NEER.\par Surgical Wounds: none.\par \par Left elbow\par Inspection: negative swelling.\par Wounds: none.\par Palpation: non-tender.\par ROM: full ROM.\par Strength: 5/5 all groups.\par Stability: no instability.\par Mass: none.\par \par Right elbow\par Inspection: negative swelling.\par Wounds: none.\par Palpation: non-tender.\par ROM: full ROM.\par Strength: 5/5 all groups.\par Stability: no instability.\par Mass: none.\par \par Left wrist\par Inspection: negative swelling.\par Wound: none.\par Palpation (bone): no tenderness.\par ROM: full ROM.\par Strength: full , good.\par \par Right wrist\par Inspection: negative swelling.\par Wound: none.\par Palpation (bone): no tenderness.\par ROM: full ROM.\par Strength: full , good.\par \par Left hand\par Inspection: no skin changes, normal appearance.\par Wounds: none.\par Strength: full , able to make full fist.\par Sensation: light touch intact all fingers and thumb.\par Vascular: good capillary refill < 3 seconds, all fingers and thumb.\par Mass: none.\par \par Right hand\par Inspection: no skin changes, normal appearance.\par Wounds: none.\par Strength: full , able to make full fist.\par Sensation: light touch intact all fingers and thumb.\par Vascular: good capillary refill < 3 seconds, all fingers and thumb.\par Mass: none.  [de-identified] : Left knee xrays, standing AP/Lateral and Merchant films, and 45 degree PA standing view, taken at the office today shows normal alignment, fibula and tibia joint space maintained, patella sits at an appropriate height in a central position, metal artifact noted in the proximal soft tissue adjacent to the tibia \par \par Right knee xray merchant view taken at the office today demonstrates patella sits at an appropriate height in a central position, joint space maintained \par \par MRI right knee taken 7/13/2020: films brought in and reviewed, see attached report. I agree with radiologist report including complex tear in medial meniscus \par \par MRI left knee taken 7/13/2020: films brought in and reviewed, see attached report. 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

## 2020-07-29 NOTE — DISCUSSION/SUMMARY
[de-identified] : Discussed at length the nature of the patient’s condition. Their right knee symptoms appear secondary to complex tear in medial meniscus, that appears to be asymptomatic. 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 No surgery or injections are warranted at this time. Although, I feel that PT will be more beneficial he was hesitant. Therefore, I have provided him with home exercises. He can continue with Tylenol prn for any pain he experiences. He can return as needed. \par \par

## 2020-07-29 NOTE — ADDENDUM
[FreeTextEntry1] : This note was written by Jennifer Szymanski on 07/29/2020 acting as scribe for Dr. Quirino May M.D.\par \par I, Dr. Quirino May, have read and attest that all the information, medical decision making and discharge instructions within are true and accurate.

## 2020-07-29 NOTE — CONSULT LETTER
[Dear  ___] : Dear  [unfilled], [Courtesy Letter:] : I had the pleasure of seeing your patient, [unfilled], in my office today. [Sincerely,] : Sincerely, [Consult Closing:] : Thank you very much for allowing me to participate in the care of this patient.  If you have any questions, please do not hesitate to contact me. [FreeTextEntry3] : Dr. Quirino May M.D. F.A.C.S.\par ______________________________________________\par , Orthopedic [facility], [facility]\par [address] [place] [address], [address]\[place] [address]\par (t) [phone] or [phone]\par (f) [phone]

## 2020-07-29 NOTE — HISTORY OF PRESENT ILLNESS
[de-identified] : 79 year old male presents for initial evaluation of left knee pain. He states that he has endure this pain for approximately 20 days, with slight improvement. He state that the severity of his pain was a 6/10, but is now a 5/10.  He has no other symptoms. He takes only Tylenol prn. He states that his right knee does not bother him.

## 2020-08-26 NOTE — DISCHARGE NOTE ADULT - HOSPITAL COURSE
LM pt 3er try. I move patient to another provider, I don't want pt miss appt , situation was explain on message situation can be explain tomorrow    75 yo M underwent uncomplicated TURP on 3/8/17.  CBI d/c POD #1, urine remained acceptable in color.  Pain well controlled, tolerating diet, ambulatory.  Successful TOV POD #1 and pt d/c to f/u with Dr. Pablo.  I-stop checked.

## 2020-10-04 ENCOUNTER — TRANSCRIPTION ENCOUNTER (OUTPATIENT)
Age: 80
End: 2020-10-04

## 2021-01-04 NOTE — ASU PATIENT PROFILE, ADULT - ANESTHESIA, PREVIOUS REACTION, PROFILE
Health Maintenance Due   Topic Date Due   • Shingles Vaccine (1 of 2) 10/09/1993       Declines shingles today.          none

## 2021-05-11 ENCOUNTER — EMERGENCY (EMERGENCY)
Facility: HOSPITAL | Age: 81
LOS: 1 days | Discharge: HOME | End: 2021-05-11
Attending: EMERGENCY MEDICINE
Payer: MEDICARE

## 2021-05-11 VITALS
TEMPERATURE: 98 F | SYSTOLIC BLOOD PRESSURE: 201 MMHG | RESPIRATION RATE: 18 BRPM | WEIGHT: 175.05 LBS | HEART RATE: 72 BPM | HEIGHT: 64 IN | DIASTOLIC BLOOD PRESSURE: 82 MMHG | OXYGEN SATURATION: 97 %

## 2021-05-11 VITALS
HEART RATE: 59 BPM | DIASTOLIC BLOOD PRESSURE: 82 MMHG | SYSTOLIC BLOOD PRESSURE: 177 MMHG | RESPIRATION RATE: 18 BRPM | OXYGEN SATURATION: 97 %

## 2021-05-11 LAB — SARS-COV-2 RNA SPEC QL NAA+PROBE: SIGNIFICANT CHANGE UP

## 2021-05-11 PROCEDURE — 99283 EMERGENCY DEPT VISIT LOW MDM: CPT | Mod: CS

## 2021-05-11 RX ORDER — AMLODIPINE BESYLATE 2.5 MG/1
5 TABLET ORAL ONCE
Refills: 0 | Status: COMPLETED | OUTPATIENT
Start: 2021-05-11 | End: 2021-05-11

## 2021-05-11 RX ORDER — METOPROLOL TARTRATE 50 MG
100 TABLET ORAL ONCE
Refills: 0 | Status: COMPLETED | OUTPATIENT
Start: 2021-05-11 | End: 2021-05-11

## 2021-05-11 RX ADMIN — AMLODIPINE BESYLATE 5 MILLIGRAM(S): 2.5 TABLET ORAL at 09:24

## 2021-05-11 RX ADMIN — Medication 100 MILLIGRAM(S): at 09:24

## 2021-05-11 NOTE — ED PROVIDER NOTE - CARE PLAN
Principal Discharge DX:	Encounter for laboratory testing for COVID-19 virus  Secondary Diagnosis:	Elevated blood pressure reading

## 2021-05-11 NOTE — ED PROVIDER NOTE - ATTENDING CONTRIBUTION TO CARE
The patient seen and examined    I, Nirmalyolanda Rinaldi, performed the initial face to face bedside interview with this patient regarding history of present illness, review of symptoms and relevant past medical, social and family history.  I completed an independent physical examination.  I was the initial provider who evaluated this patient. I have signed out the follow up of any pending tests (i.e. labs, radiological studies) to the ACP.  I have communicated the patient’s plan of care and disposition with the ACP.

## 2021-05-11 NOTE — ED PROVIDER NOTE - PROGRESS NOTE DETAILS
HELEN New NOTE: Rpt BP improved, advised to f/u with PMD for elevated reading and take BP meds as directed  Reviewed all results and plan with Dr. Rinaldi. Pt stable for d/c, reports improvement, VSS, tolerating PO, ambulatory.  Discussion includes results, plan, proper medication use/side effects, and return precautions. Pt advised to f/u with PMD 1-2 days.  Pt verbalized understanding/agreement of plan.

## 2021-05-11 NOTE — ED ADULT NURSE NOTE - OBJECTIVE STATEMENT
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   pt too 'stressed out' to take medications  didn't take last night nor this AM meds.

## 2021-05-11 NOTE — ED PROVIDER NOTE - NS ED ROS FT
Const: no fever , no chills  Eyes: no redness, no discharge, no visual changes   ENT: no ear pain, no throat pain, no congestion  Cardiac: no chest pain, no palpitations  Resp: no cough, no SOB  GI: no abd pain, no n/v/d  : no dysuria   Skin: no rash  Neuro: no HA, no dizziness, no numbness, no weakness

## 2021-05-11 NOTE — ED PROVIDER NOTE - CLINICAL SUMMARY MEDICAL DECISION MAKING FREE TEXT BOX
PT presents for COVID testing. Denies any symptoms. No known + COVID contact reported. Needs swab for travel. Also did not take blood pressure medication this morning or last night because "worried," about upcoming trip. Denies visual changes, HA, chest pain, SOB, abd pain, numbness, weakness. Neuro intact, no acute complaints  -Will COVID test, give home BP medicine and reassess blood pressure

## 2021-05-11 NOTE — ED PROVIDER NOTE - PATIENT PORTAL LINK FT
You can access the FollowMyHealth Patient Portal offered by [facility] by registering at the following website: [email or web address]. By joining [employer]’s FollowMyHealth portal, you will also be able to view your health information using other applications (apps) compatible with our system.

## 2021-05-11 NOTE — ED PROVIDER NOTE - PHYSICAL EXAMINATION
Vital signs noted, see flowsheet.  General: NAD, well appearing and non-toxic.  HEENT: NC/AT. MMM. Conjunctiva and sclera clear b/l.  EOMI. PERRL.  Neck: Soft and supple, full ROM without pain.  Cardiac: RRR. +S1/S2. Peripheral pulses 2+ and symmetric b/l. No LE edema.  Respiratory: Speaking in full sentences, no evidence of respiratory distress. Lungs CTA b/l, no wheezes/rhonchi/rales/stridor.   Abdomen: Soft, NTND. No guarding or rebound tenderness. No suprapubic tenderness.  Back: Spine midline and non-tender. No CVAT.  Skin: Normal color for race, no evidence of rash, ecchymosis, cyanosis or jaundice.   Neuro: Awake, alert and oriented to person/place/time/situation. Speech clear, no focal deficits, no facial droop  Follows commands appropriately.  Sensation intact b/l no deficits,  strong and equal.  Strength 5/5 symmetrical upper/lower extremities.  CN II-XII intact. No dysmetria. Ambulatory independently, No ataxic gait, no drift.  Psych: Normal affect

## 2021-05-11 NOTE — ED PROVIDER NOTE - NSFOLLOWUPINSTRUCTIONS_ED_ALL_ED_FT
READ ALL ATTACHED INSTRUCTIONS FOR CORONAVIRUS IMMEDIATELY   - You were tested for COVID-19 (Coronavirus) during your visit to Emergency Department.   - Do not return to work/school/public areas/public transit until you have tested negative for COVID-19.  - Results will be available in 1-2 days. Self quarantine until COVID-19 results available.  - Monitor your symptoms using symptom tracker.  - Practice social distancing and avoid contact with others. Avoid sharing personal household items.   - Practice proper hand hygiene. Disinfect surfaces frequently. Cover your coughs and sneezes.   - Stay hydrated.      SEEK IMMEDIATE MEDICAL CARE IF YOU HAVE ANY OF THE FOLLOWING SYMPTOMS  **Seek immediate medicate attention if you develop new/worsening, signs/symptoms or concerns including, but not limited to shortness of breath, difficulty breathing, chest pain, confusion, severe weakness.**    If you believe you are experiencing a medical emergency call 9-1-1.     - Your blood pressure reading was high while in ED, please monitor your blood pressure at home and follow up with PMD.     Hypertension    Hypertension, commonly called high blood pressure, is when the force of blood pumping through your arteries is too strong. Hypertension forces your heart to work harder to pump blood. Your arteries may become narrow or stiff. Having untreated or uncontrolled hypertension for a long period of time can cause heart attack, stroke, kidney disease, and other problems. If started on a medication, take exactly as prescribed by your health care professional. Maintain a healthy lifestyle and follow up with your primary care physician.    SEEK IMMEDIATE MEDICAL CARE IF YOU HAVE ANY OF THE FOLLOWING SYMPTOMS: severe headache, confusion, chest pain, abdominal pain, vomiting, or shortness of breath.     - Please follow up with your Primary Care Doctor in 1 - 2 days. If you cannot follow-up with your primary care doctor please return to the Emergency Department for any urgent issues.  - Seek immediate medical care for any new, worsening or concerning signs or symptoms.     - If you have difficulty following up, please call: [phone] or go to [email or web address] to obtain a [facility] doctor or specialist who takes your insurance in your area.    Feel better!

## 2021-05-11 NOTE — ED PROVIDER NOTE - OBJECTIVE STATEMENT
PT presents for COVID testing. Denies any symptoms. No known + COVID contact reported. Needs swab for travel. Also did not take blood pressure medication this morning or last night because "worried," about upcoming trip. Denies visual changes, HA, chest pain, SOB, abd pain, numbness, weakness.

## 2021-05-11 NOTE — ED ADULT NURSE NOTE - PSH
Coronary Stent  RCA  H/O hernia repair  Right inguinal-2002  Renal cancer  h/o right nephrectomy- 05/2011  right partial nephrectomy  2/2010 no further treatment  S/P Right Inguinal Hernia Repair

## 2021-05-13 PROCEDURE — 99283 EMERGENCY DEPT VISIT LOW MDM: CPT

## 2021-05-13 PROCEDURE — U0003: CPT

## 2021-05-13 PROCEDURE — U0005: CPT

## 2022-04-22 ENCOUNTER — TRANSCRIPTION ENCOUNTER (OUTPATIENT)
Age: 82
End: 2022-04-22

## 2022-08-23 ENCOUNTER — APPOINTMENT (OUTPATIENT)
Dept: UROLOGY | Facility: CLINIC | Age: 82
End: 2022-08-23

## 2022-08-23 DIAGNOSIS — N40.1 BENIGN PROSTATIC HYPERPLASIA WITH LOWER URINARY TRACT SYMPMS: ICD-10-CM

## 2022-08-23 DIAGNOSIS — C64.9 MALIGNANT NEOPLASM OF UNSPECIFIED KIDNEY, EXCEPT RENAL PELVIS: ICD-10-CM

## 2022-08-23 DIAGNOSIS — N13.8 BENIGN PROSTATIC HYPERPLASIA WITH LOWER URINARY TRACT SYMPMS: ICD-10-CM

## 2022-08-23 PROCEDURE — 51798 US URINE CAPACITY MEASURE: CPT

## 2022-08-23 PROCEDURE — 99213 OFFICE O/P EST LOW 20 MIN: CPT

## 2022-08-23 NOTE — HISTORY OF PRESENT ILLNESS
[FreeTextEntry1] : Right radical nephrectomy in 2011\par TURP in 2017. He has urinary frequency . He had a CABG in January 2018 . \par Has been following with nephrology for mild CKD\par Was told that his kidney numbers were low recently (? GFR 30 per patient)\par Otherwise feeling fine, no weight loss, fevers/chills, bone pain, neurologic symptoms.\par PVR: 1cc

## 2022-08-23 NOTE — ASSESSMENT
[FreeTextEntry1] : 81M with h/o right radical nephrectomy (2011) and TURP in 2017, ? recent worsening of CKD.\par \par - ANGELIC normal\par - PVR: 1cc today\par - No evidence of obstructive uropathy\par - Renal US to rule out any recurrence\par - Refer to Dr Orozco for worsening renal function

## 2022-08-30 ENCOUNTER — APPOINTMENT (OUTPATIENT)
Dept: NEPHROLOGY | Facility: CLINIC | Age: 82
End: 2022-08-30

## 2022-08-30 VITALS
BODY MASS INDEX: 28.82 KG/M2 | WEIGHT: 173 LBS | HEART RATE: 60 BPM | DIASTOLIC BLOOD PRESSURE: 66 MMHG | TEMPERATURE: 98.2 F | HEIGHT: 65 IN | RESPIRATION RATE: 16 BRPM | SYSTOLIC BLOOD PRESSURE: 130 MMHG

## 2022-08-30 DIAGNOSIS — Z90.5 ACQUIRED ABSENCE OF KIDNEY: ICD-10-CM

## 2022-08-30 DIAGNOSIS — I25.10 ATHEROSCLEROTIC HEART DISEASE OF NATIVE CORONARY ARTERY W/OUT ANGINA PECTORIS: ICD-10-CM

## 2022-08-30 DIAGNOSIS — F17.200 NICOTINE DEPENDENCE, UNSPECIFIED, UNCOMPLICATED: ICD-10-CM

## 2022-08-30 DIAGNOSIS — Z87.891 PERSONAL HISTORY OF NICOTINE DEPENDENCE: ICD-10-CM

## 2022-08-30 PROCEDURE — 99205 OFFICE O/P NEW HI 60 MIN: CPT

## 2022-09-01 ENCOUNTER — NON-APPOINTMENT (OUTPATIENT)
Age: 82
End: 2022-09-01

## 2022-09-06 DIAGNOSIS — R79.89 OTHER SPECIFIED ABNORMAL FINDINGS OF BLOOD CHEMISTRY: ICD-10-CM

## 2022-09-06 LAB
25(OH)D3 SERPL-MCNC: 34.2 NG/ML
ALBUMIN SERPL ELPH-MCNC: 4.1 G/DL
ANION GAP SERPL CALC-SCNC: 13 MMOL/L
APPEARANCE: CLEAR
BACTERIA: NEGATIVE
BASOPHILS # BLD AUTO: 0.06 K/UL
BASOPHILS NFR BLD AUTO: 0.8 %
BILIRUBIN URINE: NEGATIVE
BLOOD URINE: NEGATIVE
BUN SERPL-MCNC: 36 MG/DL
CALCIUM SERPL-MCNC: 9.1 MG/DL
CALCIUM SERPL-MCNC: 9.1 MG/DL
CHLORIDE SERPL-SCNC: 108 MMOL/L
CO2 SERPL-SCNC: 23 MMOL/L
COLOR: YELLOW
CREAT SERPL-MCNC: 2.53 MG/DL
CREAT SPEC-SCNC: 127 MG/DL
CREAT/PROT UR: 0.9 RATIO
EGFR: 25 ML/MIN/1.73M2
EOSINOPHIL # BLD AUTO: 0.44 K/UL
EOSINOPHIL NFR BLD AUTO: 5.9 %
GLUCOSE QUALITATIVE U: NEGATIVE
GLUCOSE SERPL-MCNC: 84 MG/DL
HCT VFR BLD CALC: 39.4 %
HGB BLD-MCNC: 12.4 G/DL
HYALINE CASTS: 2 /LPF
IMM GRANULOCYTES NFR BLD AUTO: 0.4 %
KETONES URINE: NEGATIVE
LEUKOCYTE ESTERASE URINE: NEGATIVE
LYMPHOCYTES # BLD AUTO: 1.32 K/UL
LYMPHOCYTES NFR BLD AUTO: 17.6 %
MAN DIFF?: NORMAL
MCHC RBC-ENTMCNC: 31.1 PG
MCHC RBC-ENTMCNC: 31.5 GM/DL
MCV RBC AUTO: 98.7 FL
MICROSCOPIC-UA: NORMAL
MONOCYTES # BLD AUTO: 0.78 K/UL
MONOCYTES NFR BLD AUTO: 10.4 %
NEUTROPHILS # BLD AUTO: 4.85 K/UL
NEUTROPHILS NFR BLD AUTO: 64.9 %
NITRITE URINE: NEGATIVE
PARATHYROID HORMONE INTACT: 103 PG/ML
PH URINE: 6
PHOSPHATE SERPL-MCNC: 3.7 MG/DL
PLATELET # BLD AUTO: 175 K/UL
POTASSIUM SERPL-SCNC: 5.1 MMOL/L
PROT UR-MCNC: 120 MG/DL
PROTEIN URINE: ABNORMAL
RBC # BLD: 3.99 M/UL
RBC # FLD: 14 %
RED BLOOD CELLS URINE: 1 /HPF
SODIUM SERPL-SCNC: 144 MMOL/L
SPECIFIC GRAVITY URINE: 1.02
SQUAMOUS EPITHELIAL CELLS: 0 /HPF
UROBILINOGEN URINE: NORMAL
WBC # FLD AUTO: 7.48 K/UL
WHITE BLOOD CELLS URINE: 1 /HPF

## 2022-09-12 ENCOUNTER — NON-APPOINTMENT (OUTPATIENT)
Age: 82
End: 2022-09-12

## 2022-09-12 LAB
ALBUMIN SERPL ELPH-MCNC: 4.4 G/DL
ANION GAP SERPL CALC-SCNC: 13 MMOL/L
BUN SERPL-MCNC: 39 MG/DL
CALCIUM SERPL-MCNC: 9.3 MG/DL
CHLORIDE SERPL-SCNC: 109 MMOL/L
CO2 SERPL-SCNC: 22 MMOL/L
CREAT SERPL-MCNC: 2.12 MG/DL
EGFR: 31 ML/MIN/1.73M2
GLUCOSE SERPL-MCNC: 109 MG/DL
PHOSPHATE SERPL-MCNC: 3.9 MG/DL
POTASSIUM SERPL-SCNC: 4.6 MMOL/L
SODIUM SERPL-SCNC: 144 MMOL/L

## 2022-11-14 NOTE — H&P PST ADULT - NEGATIVE IMMUNOLOGICAL SYMPTOMS
[facility]    Medicine Progress Note - Hospitalist Service    Date of Admission:  8/11/2022    Hospital Stay Brief summary:  65yo M  with PMH of ESRD on HD, recurrent cellulitis with massive lymphedema/elephantiasis, morbid obesity, pulmonary HTN, multiple hospitalizations since March of 2022 due to bacteremia with a variety of species identified, most notably Klebsiella, streptococcus and Morganella (source thought to be related to chronic cellulitis of his legs).   On 7/4/22, he presented to OSH ED following an episode of hypotension and bradycardia on dialysis. On ED presentation, SBPs were in the 60's-70's. Lactate was 13.5, WBC 4.7, procal was 0.48. Pressures were minimally responsive to fluid resuscitation, ultimately required pressors. 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 Was started on vanc following ID consultation. Blood cultures have had no growth to date. Cardiology and cardiothoracic surgery were consulted and initially felt the patient was not a surgical candidate given his ongoing pressor requirements. Following improvement of lactate, patient was felt to be a potential operative candidate and was ultimately transferred to [facility] for further treatment and possible cardiothoracic intervention. Underwent aortic valve replacement (INSPIRIS RESILIA AORTIC VALVE 25MM) and CABG x1 (LIMA -> LAD), open chest on 7/12 by Dr. Dunbar, tooth extraction 7/22 with dental. Prolonged ICU course due to ongoing vasopressor needs and CRRT, transitioned to iHD and off pressors. He was severely deconditioned and required long-term antibiotics for endocarditis. [facility], on IV abx and on room air.    [facility] Course:  8/11- 8/21: Care conference on 8/18 with sister, care team.  Asymptomatic short few beat VT runs intermittently. Bradycardic spell improved with BiPAP.   Continue telemetry.  Remains on amiodarone.  US abdomen/Dopplers 8/17 unremarkable.  LFTs improving, stable CBC.  Lipase 52, lactate normal.  encouraged to use BiPAP.   Remains constantly nauseated, not eating much due to nausea.  Tubefeedings changed to bolus per RD recommendations 8/15.  Holding Renvela to see if that helps nausea (started 8/12, stopped 8/18), continue to hold Actigall.  Nausea seems to be improved with holding Renvela therefore now discontinued.  Phosphate 6.2 on 8/19 and 5.7 on 8/21.  Plan to start lanthanum by early next week once nausea is resolved to assess any GI side effects from phosphate binder. Minor nasal bleeding due to NG tube, started saline nasal spray with improvement. Continue with therapies for lymphedema, physical deconditioning and wound cares.  On room air and nocturnal BiPAP. Continue IV antibiotics (Rocephin, doxycycline).   Updated sister.  8/22-8/28: Patient has been struggling with nausea on and off.  We adjusted his tube feeding schedule and this helped with nausea.  We also offered him IV Zofran.  He was able to tolerate oral diet well.  NG tube discontinued 8/25.  Patient progressing well.  Reported indigestion 8/26.  Was started on Tums as needed.  Today,8/28 he states he is doing well.  Indigestion controlled and tolerating diet.  He reports no new complaints.     9/5-9/11:Progessing well.  Dialyzing and tolerating oral diet.  Had intermittent nausea that is controlled with Zofran 9/8.  Otherwise social work working for placement to TCU.  Having challenges to find an appropriate place due to dialysis.  9/11, No new changes today.  Continue current medical management.  9/12-9/18: Loose stool improved with cholestyramine (started 9/13) .  9 /12 - Dialysis limited by hypotension and deconditioned state (unable to dialyze in chair). Dialysis in chair on 9/16/22 (no UF d/t hypotension) but tolerating. TCU placement PENDING. Next dialysis is 9/19/22 in chair.   9/19.   Patient dialyzing unfortunately the did not put him in a chair.  He states he is doing well.  I had a conversation with nephrology and they will pay more attention to dialyzing in a chair.  Otherwise no new complaints today.  Just about the same compared to yesterday.  He has a sodium of 129  9/20-9/25. Patient reports he is progressing well.  Working well with therapy. He reports no complaints at this time.  Patient currently displaying no signs/symptoms of TB 9/21. Patient started dialyzing in a chair.  Has been progressing well. Still unable to ambulate.  Hyponatremia resolving.  Most recent sodium on 9/23, 134.  Has not been able to effectively ambulate on his own,working with therapies.  Encouraging patient to get out of bed.  9/25. Doing well. No new changes at this time. Awaiting placement.  9/26-10/16: Progressing well with therapies.  Dialyzing well MWF.  Oral intake adequate with occasional nausea especially with dialysis, Zofran effective if needed.  Has lost weight of over >100 lbs (from 375 lbs to now 245 lbs).  Sister expressed concerns regarding patient's eating habits, morbid obesity and focus on food. Continue to emphasize importance of low calorie diet healthy lifestyle, compliance to medications and medical follow-up to patient.  He remains motivated and engaged in therapies.  Stopped cholestyramine 9/30 since now constipated, started bowel regimen with Dulcolax suppository, MiraLAX and Kandice-Colace as needed. Started fleets enema 10/13 with adequate results.  Has painful hemorrhoids with minor rectal bleeding, start Anusol HC suppository.  Patient refused oral mineral oil, hemorrhoidal pain improved with topical hydrocortisone-pramoxine.  Increased docusate to 400 mg twice daily for couple of days.  Since constipation now improving after intensifying bowel regimen, decreased docusate and Kandice-Colace.  Lymphedema progressively improving. On fluid restrictions per nephrology.  PICC line removed  "10/13/2022.  Drawing labs on dialysis days.  Awaiting placement  10/17-10/23:  OT noted patient previously refusing to work with therapy.  Apparently he had refused almost 10 sessions of therapy.  Patient noted he feels weak and tired especially on his dialysis days and he does not want engage in therapy.  We encouraged patient.  He is now willing to try alternate therapy.  Otherwise no new other changes.  He is now dialyzing in a chair.  10/23.  Doing well.  Continue with current medical management.  Awaiting placement.  10/24-10/30: No acute events. TCU placement PENDING. Medication/ Management changes: (1)  titrated of PPI as GI ppx not indicated at this time (2) discontinued torsemide as patient was producing minimal urine (3) Midodrine prn and scheduled, adjusted EDW and cut back on UF as patient was having orthostatic hypotension.  -Activity Goals discussed with the patient:   (1) HD must be in chair for each HD session.   (2) Out in chair at 10am daily, to work with PT.   10/31-11/5.  Patient doing well.  No new changes.  Has been dialyzing in a chair.  Gaining strength.  11/5.  Continue current medical management.  Waiting for placement  11/7-11/13: This week pt's INR remained subtherapeutic and heparin subQ was increased from q12 to q8 to help cover. Question whether previous INR >10 was real. INR uptrending. Still with orthostasis during PT but improving with midodrine timing prior to therapy and with HD. Had nausea 11/11-11/12 likelky 2/2 orthostasis now improved. Intermittently refusing lab draws (\"too many needle sticks\") and late administration of heparin ovn. Placement remains pending. Edema greatly improved, likely nearing end of fluid removal.   11/14.  He appears tired reports nausea and states had 1 episode of nonbilious emesis.  On Zofran.  INR today is 2.24.  Heparin subcu has been discontinued.     Follow-ups:  -No specific follow-up arranged with Cardiology, Cardiac Surgery.  -Recommend " routine follow-up after LTACH discharge with Cardiac Surgery and with Cardiology  -Nephrology follow-up with hemodialysis    Assessment & Plan         Hx of endocarditis - s/p AVR (Inspiris) and CABG x1 (LIMA to LAD) by Dr. Dunbar on 7/12- left open-chested, Chest closed/plated on 7/14  Endocarditis with aortic root abscess  Severe aortic insufficiency- improved  Tricuspid regurgitation- mild  Coronary Artery Disease  Atrial Fibrillation  Multifactorial shock (septic, cardiogenic) resolved  Morbid obesity  Pulmonary HTN, severe (PA pressures of 62 on last TTE 8/3) no treatment indicated at this time.  HFrEF (35-40% on admission), improved to 55-60 % on TTE 8/3  -Was on longstanding pressors from 7/12>8/7  -Steroids:  s/p Stress dose steroids: Hydrocortisone 50 mg q6, completed on 8/7. Previously on prednisone 5 mg daily transitioned to prednisone taper, ended 10/7.  - Not on beta blocker at this time due to previously low BP  Plan:  - Continue ASA 81 mg daily  - Continue Lipitor 40 mg daily  - Continue Amiodarone 200 mg daily for Afib (maintenance dose)(periodic few beat asymptomatic VT runs observed on telemetry but stable)  - Continue Coumadin for anticoagulation. Pharmacy helping to dose Coumadin.  INR goal 2-3.  INR now on goal.  Heparin subcu has been discontinued  - Continue Midodrine 10 mg Q8 & PRN for HD, to be weaned down as BP improves  - Sternal precautions in place    Orthostatic Hypotension  - Most likely related to dialysis and continual fluid removal; also multiple cardiac diagnoses  - On midodrine 10mg q8h scheduled, also with prn doses for HD and PT  -Orthostatic hypotension has been a barrier to patient reports taking with PT fully  - Unable to volume resuscitate or increase Na intake given ongoing HD  - Mild hyponatremia, managed with HD  - Per pharmacy, can consider midodrine 20mg q8, also droxidopa (NE precursor) an option  - Continue to try to time midodrine prior to PT    Infective  endocarditis with aortic root abscess. Treated  H/o bacteremia with strep sp, morganella, and klebsiella  Periapical dental abscess (2nd and 3rd R molars). Sutures dissolvable   Remains afebrile, no signs or symptoms of infection  - Repeat blood culture 8/4, NGTD  - ID previously consulted   - Completed course antibiotics : Doxycycline (end date 8/28) and Ceftriaxone (end date 8/25)  - Continue to monitor fever curve, CBC     Nausea, 2/2 orthostasis - IMPROVED  -Started s/p HD 11/11, likely related to fluid shifts and orthostasis in setting of HD  -No associated vomiting  -Patient with increased flatulence/eructations  - Encouraged simethicone use  - Zofran 4mg q6h prn may alternate with Compazine if needed    History of Acute respiratory failure  KAYDEN  -Extubated at previous hospital.  Now on room air. Saturating well on RA/BiPAP at night.   -Supplemental O2 PRN to keep sats > 92%. Wean off as tolerated.  -Continue nocturnal BiPAP as tolerated, nebs as needed     Encephalopathy, suspect toxic metabolic- resolved  Anxiety  Depression  -No confusion at this time  -Continue Sertraline 100mg  -Continue Trazodone 25mg PRN at bedtime   -PTA meds ON HOLD: Alprazolam 0.25mg PRN, tramadol 50mg PRN, trazodone 100mg , melatonin 10mg     End-stage renal disease, on dialysis MWF  Electrolyte Abnormalities  Hyponatremia.   - Patient sodium in the low 130's but stable.  Continue fluid restriction.  Nephrology consulted and following.  -HD per Nephrology MWF, tolerating well   -Replete electrolytes as indicated  -Retacrit per nephrology  -Trial of torsemide discontinued 10/26 , oliguria  - Phosphate binding: Continue Lanthanum (Of note-  Renvela 8/12-  8/18 discontinued due to nausea. Started Lanthanum by 8/22 -tolerating better than renvela)  -Strict I/O, daily weights  -Avoid / limit nephrotoxins as able     ALMANZAR  Transaminitis, trended down now stable  Hyperbilirubinemia-Stable  Hepatosplenomegaly  -LFTs: have trended down in  the last couple of weeks (AST//115 --> 66/70).  T. bili also trending down from 3.5  to 0.6, 10/24.    -Pharmacological Agents: Previously on Ursodiol 300 BID for hyperbilirubinemia, previously held 8/16 due to ongoing nausea. Discontinued Ursodiol 8/25.  -Imaging:   -US abdomen 7/18/2022 showed hepatosplenomegaly otherwise unremarkable. GB not well visualized.   -US abdomen/Dopplers 8/17 unremarkable with stable hepatosplenomegaly.     Moderate Protein-Calorie Malnutrition  -Dietitian consulted and following  -Speech therapy consulted and following  -Poor appetite, early satiety (not candidate for Reglan due to prolonged QTc 8/11/22).  -NG tube discontinued 8/25  -Appetite now much improved, tolerating regular diet    Diarrhea, Resolved  -C Diff negative 7/18, 8/2    Constipation  Painful hemorrhoids, controlled  -s/p Cholestyramine (started 9/13, stopped 9/30 since constipation developed)  -Constipation flared up painful hemorrhoids and minor rectal bleeding.  -continue bowel regimen - doc-senna 2 BID prn, miralax every day prn - will consider scheduled if no BM x2 days  - pt refusing suppository     Stress induced hyperglycemia   Hgb A1c 5.9  - Initially managed on insulin drip postoperatively, transitioned now off insulin      Acute blood loss anemia and thrombocytopenia. RUE DVT (RIJ)   Hgb as low as 7.6.  Transfused 1 unit PRBC 8/15.    -No signs or symptoms of active bleeding at this time  - H&H stable at this time  -Transfuse to keep Hgb >8 given CAD  -Retacrit per Nephrology     Anticoagulation/DVT prophylaxis  -ASA 81mg  -Coumadin for AF. INR goal 2-3.   - heparin 5000u subQ q8h     Sternotomy Wound  Surgical incision  - Sternal precautions  - Continue wound care    Morbid obesity  Elephantiasis with chronic lymphedema of lower extremities  -Continue wound cares  -Significant weight loss since admit from 375 lbs to now 240 lbs  -Encouraged to maintain low calorie diet, avoid excessive calories to  maintain weight loss  -Patient will benefit from consideration for pharmacotherapy with medication like Mounjaro or Ozempic as outpatient for continued maintenance of weight loss, appetite suppression    GI PPX  -Discontinued PPI (10/30). Started GI ppx post-operatively after CABG during acute hospitalization    -Patient tolerating diet (10/30), no symptoms of GERD/ PUD    Diet: Combination Diet Regular Diet; Low Phosphorous Diet  Fluid restriction 1800 ML FLUID  Snacks/Supplements Adult: Nepro Oral Supplement; With Meals    DVT Prophylaxis: Warfarin  Darden Catheter: Not present  Central Lines: PRESENT  CVC Left Subclavian Tunneled-Site Assessment: WDL    Cardiac Monitoring: ACTIVE order. Indication: QTc prolonging medication (48 hours)  Code Status: Full Code    Dispo: stable, pending placement    The patient's care was discussed with the nursing staff.    Yfn Marrufo MD  Hospitalist Service  [facility]  Securely message with the Vocera Web Console (learn more here)  Text page via [employer] Paging/Directory   ______________________________________________________________________    Interval History                                                                                                                     Patient reports nausea and an episode of near emesis.  He just took his Zofran and thinks it is helping.  He reports he feels weak and would like to rest.  INR today is 2.24.                                                                      Review of system: All other systems are reviewed and found to be negative except as stated above in the interval history.    Physical Exam   Vital Signs: Temp: 98.7  F (37.1  C) Temp src: Oral BP: 93/63 Pulse: 79   Resp: 20 SpO2: 99 % O2 Device: None (Room air)    Weight: 223 lbs 14.4 oz   Vitals:    11/11/22 0624 11/13/22 0621 11/14/22 0503   Weight: 109.1 kg (240 lb 9.6 oz) 109.6 kg (241 lb 11.2 oz) 101.6 kg (223 lb 14.4 oz)     General: He is a well grown well-developed  adult male lying in bed comfortably no distress.  Head: Appears normocephalic atraumatic eyes pupils appear equal round and reactive to light  Lungs: He has a normal respiratory effort and auscultation breath sounds are clear.  Heart: He has a good S1 and S2 no obvious murmurs, no JVD peripheral pulses are palpable.  Abdomen: Soft nontender nondistended bowel sounds are noted no obvious organomegaly noted.  Musculoskeletal : He has good muscle tone.  Bilateral lymphedema noted.  I did not assess range of motion.   Skin : Elephantiasis bilateral lower extremities,  Mid sternal wound noted. Please refer to wound care/nursing note for complete skin assessment     Data reviewed today: I reviewed all medications, new labs and imaging results over the last 24 hours. I personally reviewed     Data   Recent Labs   Lab 11/13/22  1545 11/11/22  0929 11/09/22  1645   INR 2.24* 1.48* 1.37*     No results found for this or any previous visit (from the past 24 hour(s)).  Medications     heparin (porcine) 1,000 Units/hr (11/07/22 5227)     - MEDICATION INSTRUCTIONS -         amiodarone  200 mg Oral Daily     aspirin  81 mg Oral Daily     atorvastatin  40 mg Oral QPM     epoetin fercho-epbx  6,000 Units Intravenous Weekly     heparin ANTICOAGULANT  5,000 Units Subcutaneous Q8H [facility]     [Held by provider] lanthanum  500 mg Oral TID w/meals     midodrine  10 mg Oral Q8H     mineral oil-hydrophilic petrolatum   Topical Daily     multivitamin RENAL  1 tablet Oral Daily     senna-docusate  2 tablet Oral BID     sertraline  100 mg Oral or Feeding Tube Daily     Warfarin Therapy Reminder  1 each Oral See Admin Instructions      no recurring infections/no persistent infections/no recurring pneumonia

## 2022-11-28 ENCOUNTER — NON-APPOINTMENT (OUTPATIENT)
Age: 82
End: 2022-11-28

## 2022-11-30 NOTE — H&P PST ADULT - NSANTHOBSERVEDRD_ENT_A_CORE
-non-fasting labs  -recommend to get the second COVID-19 booster  -recommend to quit smoking  -xray left knee to do  -follow up in 2 months; do labs today or within the next week  
No

## 2023-02-28 ENCOUNTER — APPOINTMENT (OUTPATIENT)
Dept: NEPHROLOGY | Facility: CLINIC | Age: 83
End: 2023-02-28

## 2023-03-02 ENCOUNTER — APPOINTMENT (OUTPATIENT)
Dept: NEPHROLOGY | Facility: CLINIC | Age: 83
End: 2023-03-02
Payer: MEDICARE

## 2023-03-02 VITALS
BODY MASS INDEX: 25.99 KG/M2 | HEART RATE: 73 BPM | DIASTOLIC BLOOD PRESSURE: 78 MMHG | HEIGHT: 65 IN | WEIGHT: 156 LBS | TEMPERATURE: 98 F | OXYGEN SATURATION: 97 % | SYSTOLIC BLOOD PRESSURE: 160 MMHG

## 2023-03-02 VITALS — SYSTOLIC BLOOD PRESSURE: 140 MMHG | DIASTOLIC BLOOD PRESSURE: 78 MMHG

## 2023-03-02 DIAGNOSIS — R80.9 PROTEINURIA, UNSPECIFIED: ICD-10-CM

## 2023-03-02 DIAGNOSIS — N18.30 CHRONIC KIDNEY DISEASE, STAGE 3 UNSPECIFIED: ICD-10-CM

## 2023-03-02 DIAGNOSIS — I10 ESSENTIAL (PRIMARY) HYPERTENSION: ICD-10-CM

## 2023-03-02 PROCEDURE — 99215 OFFICE O/P EST HI 40 MIN: CPT

## 2023-03-03 ENCOUNTER — NON-APPOINTMENT (OUTPATIENT)
Age: 83
End: 2023-03-03

## 2023-03-03 DIAGNOSIS — N18.4 CHRONIC KIDNEY DISEASE, STAGE 4 (SEVERE): ICD-10-CM

## 2023-03-03 LAB
25(OH)D3 SERPL-MCNC: 19 NG/ML
ALBUMIN SERPL ELPH-MCNC: 4.1 G/DL
ANION GAP SERPL CALC-SCNC: 13 MMOL/L
APPEARANCE: CLEAR
BACTERIA: NEGATIVE
BASOPHILS # BLD AUTO: 0.08 K/UL
BASOPHILS NFR BLD AUTO: 1.1 %
BILIRUBIN URINE: NEGATIVE
BLOOD URINE: NEGATIVE
BUN SERPL-MCNC: 53 MG/DL
CALCIUM SERPL-MCNC: 9.4 MG/DL
CALCIUM SERPL-MCNC: 9.4 MG/DL
CHLORIDE SERPL-SCNC: 109 MMOL/L
CO2 SERPL-SCNC: 20 MMOL/L
COLOR: NORMAL
CREAT SERPL-MCNC: 2.42 MG/DL
CREAT SPEC-SCNC: 103 MG/DL
CREAT/PROT UR: 1 RATIO
EGFR: 26 ML/MIN/1.73M2
EOSINOPHIL # BLD AUTO: 0.38 K/UL
EOSINOPHIL NFR BLD AUTO: 5 %
GLUCOSE QUALITATIVE U: NEGATIVE
GLUCOSE SERPL-MCNC: 110 MG/DL
HCT VFR BLD CALC: 38.5 %
HGB BLD-MCNC: 12.2 G/DL
HYALINE CASTS: 0 /LPF
IMM GRANULOCYTES NFR BLD AUTO: 0.4 %
KETONES URINE: NEGATIVE
LEUKOCYTE ESTERASE URINE: NEGATIVE
LYMPHOCYTES # BLD AUTO: 1.63 K/UL
LYMPHOCYTES NFR BLD AUTO: 21.5 %
MAN DIFF?: NORMAL
MCHC RBC-ENTMCNC: 30.4 PG
MCHC RBC-ENTMCNC: 31.7 GM/DL
MCV RBC AUTO: 96 FL
MICROSCOPIC-UA: NORMAL
MONOCYTES # BLD AUTO: 0.64 K/UL
MONOCYTES NFR BLD AUTO: 8.4 %
NEUTROPHILS # BLD AUTO: 4.82 K/UL
NEUTROPHILS NFR BLD AUTO: 63.6 %
NITRITE URINE: NEGATIVE
PARATHYROID HORMONE INTACT: 101 PG/ML
PH URINE: 6
PHOSPHATE SERPL-MCNC: 3.7 MG/DL
PLATELET # BLD AUTO: 177 K/UL
POTASSIUM SERPL-SCNC: 4.8 MMOL/L
PROT UR-MCNC: 99 MG/DL
PROTEIN URINE: ABNORMAL
RBC # BLD: 4.01 M/UL
RBC # FLD: 14.3 %
RED BLOOD CELLS URINE: 1 /HPF
SODIUM SERPL-SCNC: 143 MMOL/L
SPECIFIC GRAVITY URINE: 1.02
SQUAMOUS EPITHELIAL CELLS: 0 /HPF
UROBILINOGEN URINE: NORMAL
WBC # FLD AUTO: 7.58 K/UL
WHITE BLOOD CELLS URINE: 0 /HPF

## 2023-03-03 RX ORDER — ERGOCALCIFEROL 1.25 MG/1
1.25 MG CAPSULE ORAL
Qty: 13 | Refills: 3 | Status: ACTIVE | COMMUNITY
Start: 2023-03-03 | End: 1900-01-01

## 2024-02-19 ENCOUNTER — NON-APPOINTMENT (OUTPATIENT)
Age: 84
End: 2024-02-19

## 2024-03-21 ENCOUNTER — RX RENEWAL (OUTPATIENT)
Age: 84
End: 2024-03-21

## 2024-03-21 RX ORDER — SODIUM BICARBONATE 650 MG/1
650 TABLET ORAL
Qty: 180 | Refills: 3 | Status: ACTIVE | COMMUNITY
Start: 2023-03-03 | End: 1900-01-01
